# Patient Record
Sex: MALE | Race: WHITE | Employment: OTHER | URBAN - METROPOLITAN AREA
[De-identification: names, ages, dates, MRNs, and addresses within clinical notes are randomized per-mention and may not be internally consistent; named-entity substitution may affect disease eponyms.]

---

## 2017-10-03 ENCOUNTER — APPOINTMENT (OUTPATIENT)
Dept: GENERAL RADIOLOGY | Age: 52
End: 2017-10-03
Attending: EMERGENCY MEDICINE
Payer: COMMERCIAL

## 2017-10-03 ENCOUNTER — HOSPITAL ENCOUNTER (OUTPATIENT)
Age: 52
Setting detail: OBSERVATION
Discharge: HOME | End: 2017-10-03
Attending: EMERGENCY MEDICINE | Admitting: INTERNAL MEDICINE
Payer: COMMERCIAL

## 2017-10-03 ENCOUNTER — APPOINTMENT (OUTPATIENT)
Dept: CT IMAGING | Age: 52
End: 2017-10-03
Attending: SPECIALIST
Payer: COMMERCIAL

## 2017-10-03 VITALS
RESPIRATION RATE: 13 BRPM | OXYGEN SATURATION: 100 % | SYSTOLIC BLOOD PRESSURE: 149 MMHG | HEIGHT: 75 IN | BODY MASS INDEX: 23.25 KG/M2 | WEIGHT: 187 LBS | TEMPERATURE: 98.3 F | HEART RATE: 57 BPM | DIASTOLIC BLOOD PRESSURE: 94 MMHG

## 2017-10-03 DIAGNOSIS — R07.9 ACUTE CHEST PAIN: Primary | ICD-10-CM

## 2017-10-03 LAB
ALBUMIN SERPL-MCNC: 3.7 G/DL (ref 3.5–5)
ALBUMIN/GLOB SERPL: 1.1 {RATIO} (ref 1.1–2.2)
ALP SERPL-CCNC: 65 U/L (ref 45–117)
ALT SERPL-CCNC: 31 U/L (ref 12–78)
ANION GAP SERPL CALC-SCNC: 15 MMOL/L (ref 5–15)
APTT PPP: 23.7 SEC (ref 22.1–32.5)
AST SERPL-CCNC: 36 U/L (ref 15–37)
ATRIAL RATE: 55 BPM
BASOPHILS # BLD: 0 K/UL (ref 0–0.1)
BASOPHILS NFR BLD: 0 % (ref 0–1)
BILIRUB SERPL-MCNC: 0.3 MG/DL (ref 0.2–1)
BNP SERPL-MCNC: 46 PG/ML (ref 0–125)
BUN SERPL-MCNC: 17 MG/DL (ref 6–20)
BUN/CREAT SERPL: 15 (ref 12–20)
CALCIUM SERPL-MCNC: 8.9 MG/DL (ref 8.5–10.1)
CALCULATED P AXIS, ECG09: 33 DEGREES
CALCULATED R AXIS, ECG10: 41 DEGREES
CALCULATED T AXIS, ECG11: 41 DEGREES
CHLORIDE SERPL-SCNC: 106 MMOL/L (ref 97–108)
CK MB CFR SERPL CALC: NORMAL % (ref 0–2.5)
CK MB SERPL-MCNC: <1 NG/ML (ref 5–25)
CK SERPL-CCNC: 116 U/L (ref 39–308)
CO2 SERPL-SCNC: 23 MMOL/L (ref 21–32)
CREAT SERPL-MCNC: 1.14 MG/DL (ref 0.7–1.3)
D DIMER PPP FEU-MCNC: 0.35 MG/L FEU (ref 0–0.65)
DIAGNOSIS, 93000: NORMAL
EOSINOPHIL # BLD: 0.2 K/UL (ref 0–0.4)
EOSINOPHIL NFR BLD: 2 % (ref 0–7)
ERYTHROCYTE [DISTWIDTH] IN BLOOD BY AUTOMATED COUNT: 14.9 % (ref 11.5–14.5)
GLOBULIN SER CALC-MCNC: 3.4 G/DL (ref 2–4)
GLUCOSE SERPL-MCNC: 145 MG/DL (ref 65–100)
HCT VFR BLD AUTO: 38.7 % (ref 36.6–50.3)
HGB BLD-MCNC: 12.7 G/DL (ref 12.1–17)
INR PPP: 1 (ref 0.9–1.1)
LYMPHOCYTES # BLD: 3.2 K/UL (ref 0.8–3.5)
LYMPHOCYTES NFR BLD: 47 % (ref 12–49)
MCH RBC QN AUTO: 29.1 PG (ref 26–34)
MCHC RBC AUTO-ENTMCNC: 32.8 G/DL (ref 30–36.5)
MCV RBC AUTO: 88.8 FL (ref 80–99)
MONOCYTES # BLD: 0.6 K/UL (ref 0–1)
MONOCYTES NFR BLD: 9 % (ref 5–13)
NEUTS SEG # BLD: 2.9 K/UL (ref 1.8–8)
NEUTS SEG NFR BLD: 42 % (ref 32–75)
P-R INTERVAL, ECG05: 156 MS
PLATELET # BLD AUTO: 197 K/UL (ref 150–400)
POTASSIUM SERPL-SCNC: 3.2 MMOL/L (ref 3.5–5.1)
PROT SERPL-MCNC: 7.1 G/DL (ref 6.4–8.2)
PROTHROMBIN TIME: 10 SEC (ref 9–11.1)
Q-T INTERVAL, ECG07: 456 MS
QRS DURATION, ECG06: 98 MS
QTC CALCULATION (BEZET), ECG08: 436 MS
RBC # BLD AUTO: 4.36 M/UL (ref 4.1–5.7)
SODIUM SERPL-SCNC: 144 MMOL/L (ref 136–145)
THERAPEUTIC RANGE,PTTT: NORMAL SECS (ref 58–77)
TROPONIN I SERPL-MCNC: <0.04 NG/ML
TROPONIN I SERPL-MCNC: <0.04 NG/ML
VENTRICULAR RATE, ECG03: 55 BPM
WBC # BLD AUTO: 6.8 K/UL (ref 4.1–11.1)

## 2017-10-03 PROCEDURE — 82550 ASSAY OF CK (CPK): CPT | Performed by: EMERGENCY MEDICINE

## 2017-10-03 PROCEDURE — 71010 XR CHEST PORT: CPT

## 2017-10-03 PROCEDURE — 93005 ELECTROCARDIOGRAM TRACING: CPT

## 2017-10-03 PROCEDURE — 74011250637 HC RX REV CODE- 250/637: Performed by: EMERGENCY MEDICINE

## 2017-10-03 PROCEDURE — 74011250637 HC RX REV CODE- 250/637: Performed by: INTERNAL MEDICINE

## 2017-10-03 PROCEDURE — 85730 THROMBOPLASTIN TIME PARTIAL: CPT | Performed by: EMERGENCY MEDICINE

## 2017-10-03 PROCEDURE — 74011250636 HC RX REV CODE- 250/636: Performed by: INTERNAL MEDICINE

## 2017-10-03 PROCEDURE — 84484 ASSAY OF TROPONIN QUANT: CPT | Performed by: EMERGENCY MEDICINE

## 2017-10-03 PROCEDURE — 85379 FIBRIN DEGRADATION QUANT: CPT | Performed by: EMERGENCY MEDICINE

## 2017-10-03 PROCEDURE — 85610 PROTHROMBIN TIME: CPT | Performed by: EMERGENCY MEDICINE

## 2017-10-03 PROCEDURE — 96372 THER/PROPH/DIAG INJ SC/IM: CPT

## 2017-10-03 PROCEDURE — 94762 N-INVAS EAR/PLS OXIMTRY CONT: CPT

## 2017-10-03 PROCEDURE — 99285 EMERGENCY DEPT VISIT HI MDM: CPT

## 2017-10-03 PROCEDURE — 80053 COMPREHEN METABOLIC PANEL: CPT | Performed by: EMERGENCY MEDICINE

## 2017-10-03 PROCEDURE — 85025 COMPLETE CBC W/AUTO DIFF WBC: CPT | Performed by: EMERGENCY MEDICINE

## 2017-10-03 PROCEDURE — 99218 HC RM OBSERVATION: CPT

## 2017-10-03 PROCEDURE — 71275 CT ANGIOGRAPHY CHEST: CPT

## 2017-10-03 PROCEDURE — 36415 COLL VENOUS BLD VENIPUNCTURE: CPT | Performed by: INTERNAL MEDICINE

## 2017-10-03 PROCEDURE — 96360 HYDRATION IV INFUSION INIT: CPT

## 2017-10-03 PROCEDURE — 74011636320 HC RX REV CODE- 636/320: Performed by: RADIOLOGY

## 2017-10-03 PROCEDURE — 83880 ASSAY OF NATRIURETIC PEPTIDE: CPT | Performed by: EMERGENCY MEDICINE

## 2017-10-03 RX ORDER — SILDENAFIL CITRATE 20 MG/1
20 TABLET ORAL AS NEEDED
COMMUNITY

## 2017-10-03 RX ORDER — ASPIRIN 325 MG
325 TABLET ORAL ONCE
Status: COMPLETED | OUTPATIENT
Start: 2017-10-03 | End: 2017-10-03

## 2017-10-03 RX ORDER — ATENOLOL 50 MG/1
25 TABLET ORAL DAILY
COMMUNITY

## 2017-10-03 RX ORDER — GUAIFENESIN 100 MG/5ML
81 LIQUID (ML) ORAL DAILY
Qty: 30 TAB | Refills: 0 | Status: SHIPPED
Start: 2017-10-03

## 2017-10-03 RX ORDER — ONDANSETRON 2 MG/ML
4 INJECTION INTRAMUSCULAR; INTRAVENOUS
Status: DISCONTINUED | OUTPATIENT
Start: 2017-10-03 | End: 2017-10-03 | Stop reason: HOSPADM

## 2017-10-03 RX ORDER — GUAIFENESIN 100 MG/5ML
81 LIQUID (ML) ORAL DAILY
Status: DISCONTINUED | OUTPATIENT
Start: 2017-10-03 | End: 2017-10-03 | Stop reason: HOSPADM

## 2017-10-03 RX ORDER — AMLODIPINE BESYLATE 5 MG/1
5 TABLET ORAL DAILY
COMMUNITY

## 2017-10-03 RX ORDER — NITROGLYCERIN 0.4 MG/1
0.4 TABLET SUBLINGUAL
COMMUNITY

## 2017-10-03 RX ORDER — AMLODIPINE BESYLATE 5 MG/1
5 TABLET ORAL
Status: COMPLETED | OUTPATIENT
Start: 2017-10-03 | End: 2017-10-03

## 2017-10-03 RX ORDER — ENOXAPARIN SODIUM 100 MG/ML
40 INJECTION SUBCUTANEOUS EVERY 24 HOURS
Status: DISCONTINUED | OUTPATIENT
Start: 2017-10-03 | End: 2017-10-03 | Stop reason: HOSPADM

## 2017-10-03 RX ORDER — LISINOPRIL 20 MG/1
40 TABLET ORAL DAILY
Status: DISCONTINUED | OUTPATIENT
Start: 2017-10-04 | End: 2017-10-03 | Stop reason: HOSPADM

## 2017-10-03 RX ORDER — LISINOPRIL 20 MG/1
40 TABLET ORAL
Status: COMPLETED | OUTPATIENT
Start: 2017-10-03 | End: 2017-10-03

## 2017-10-03 RX ORDER — POTASSIUM CHLORIDE 750 MG/1
40 TABLET, FILM COATED, EXTENDED RELEASE ORAL
Status: COMPLETED | OUTPATIENT
Start: 2017-10-03 | End: 2017-10-03

## 2017-10-03 RX ORDER — AMLODIPINE BESYLATE 5 MG/1
5 TABLET ORAL DAILY
Status: DISCONTINUED | OUTPATIENT
Start: 2017-10-04 | End: 2017-10-03 | Stop reason: HOSPADM

## 2017-10-03 RX ORDER — ROSUVASTATIN CALCIUM 40 MG/1
40 TABLET, COATED ORAL
COMMUNITY

## 2017-10-03 RX ORDER — LISINOPRIL 40 MG/1
40 TABLET ORAL DAILY
COMMUNITY

## 2017-10-03 RX ORDER — SODIUM CHLORIDE 0.9 % (FLUSH) 0.9 %
5-10 SYRINGE (ML) INJECTION EVERY 8 HOURS
Status: DISCONTINUED | OUTPATIENT
Start: 2017-10-03 | End: 2017-10-03 | Stop reason: HOSPADM

## 2017-10-03 RX ORDER — ATENOLOL 25 MG/1
25 TABLET ORAL DAILY
COMMUNITY
End: 2017-10-03

## 2017-10-03 RX ORDER — SODIUM CHLORIDE 0.9 % (FLUSH) 0.9 %
5-10 SYRINGE (ML) INJECTION AS NEEDED
Status: DISCONTINUED | OUTPATIENT
Start: 2017-10-03 | End: 2017-10-03 | Stop reason: HOSPADM

## 2017-10-03 RX ORDER — ROSUVASTATIN CALCIUM 10 MG/1
40 TABLET, COATED ORAL
Status: DISCONTINUED | OUTPATIENT
Start: 2017-10-03 | End: 2017-10-03 | Stop reason: HOSPADM

## 2017-10-03 RX ORDER — NAPROXEN SODIUM 220 MG
220 TABLET ORAL
COMMUNITY
End: 2022-03-18

## 2017-10-03 RX ORDER — THERA TABS 400 MCG
1 TAB ORAL DAILY
COMMUNITY

## 2017-10-03 RX ORDER — ACETAMINOPHEN 325 MG/1
650 TABLET ORAL
Status: DISCONTINUED | OUTPATIENT
Start: 2017-10-03 | End: 2017-10-03 | Stop reason: HOSPADM

## 2017-10-03 RX ADMIN — IOPAMIDOL 100 ML: 755 INJECTION, SOLUTION INTRAVENOUS at 15:39

## 2017-10-03 RX ADMIN — ASPIRIN 325 MG: 325 TABLET, COATED ORAL at 06:23

## 2017-10-03 RX ADMIN — SODIUM CHLORIDE 1000 ML: 900 INJECTION, SOLUTION INTRAVENOUS at 15:46

## 2017-10-03 RX ADMIN — IOPAMIDOL 98 ML: 755 INJECTION, SOLUTION INTRAVENOUS at 14:41

## 2017-10-03 RX ADMIN — AMLODIPINE BESYLATE 5 MG: 5 TABLET ORAL at 17:15

## 2017-10-03 RX ADMIN — ENOXAPARIN SODIUM 40 MG: 40 INJECTION SUBCUTANEOUS at 10:01

## 2017-10-03 RX ADMIN — NITROGLYCERIN 1 INCH: 20 OINTMENT TOPICAL at 06:23

## 2017-10-03 RX ADMIN — LISINOPRIL 40 MG: 20 TABLET ORAL at 17:15

## 2017-10-03 RX ADMIN — POTASSIUM CHLORIDE 40 MEQ: 750 TABLET, FILM COATED, EXTENDED RELEASE ORAL at 07:42

## 2017-10-03 NOTE — DISCHARGE SUMMARY
Physician Discharge Summary     Patient ID:  Blade Kahn  [id number]  49 y.o.  1965    Admit date: 10/3/2017    Discharge date and time: 10/3/2017    Admission Diagnoses: Chest pain at rest  Chest pain    Discharge Diagnoses: Active Problems:    Chest pain at rest (10/3/2017)      Chest pain (10/3/2017)           Hospital Course:   Chest pain / CAD: EKG non-ischemic, first troponin negative. Trend CE and order echo Continue asa, hold BB for now. CTA chest negative. Reviewed by cardiology, stable for discharge per their recs     HTN: resume lisinopril, norvasc. Hold atenolol     XOL: resume crestor       PCP: Sd Melendez MD     Consults: Cardiology    Condition of patient at discharge: improved    Discharge Exam:  Please refer to progress note from today. Disposition: home    Patient Instructions:   Current Discharge Medication List      START taking these medications    Details   aspirin 81 mg chewable tablet Take 1 Tab by mouth daily. Qty: 30 Tab, Refills: 0         CONTINUE these medications which have NOT CHANGED    Details   lisinopril (PRINIVIL, ZESTRIL) 40 mg tablet Take 40 mg by mouth daily. rosuvastatin (CRESTOR) 40 mg tablet Take 40 mg by mouth nightly. nitroglycerin (NITROSTAT) 0.4 mg SL tablet 0.4 mg by SubLINGual route every five (5) minutes as needed for Chest Pain. amLODIPine (NORVASC) 5 mg tablet Take 5 mg by mouth daily. sildenafil (REVATIO) 20 mg tablet Take 20 mg by mouth as needed (take 1-3 tablets as needed). KRILL OIL PO Take 1 Cap by mouth daily. therapeutic multivitamin (THERAGRAN) tablet Take 1 Tab by mouth daily. atenolol (TENORMIN) 50 mg tablet Take 25 mg by mouth daily.          STOP taking these medications       ASPIRIN/SALICYLAMIDE/CAFFEINE (BC HEADACHE POWDER PO) Comments:   Reason for Stopping:         naproxen sodium (ALEVE) 220 mg tablet Comments:   Reason for Stopping:             Activity: Activity as tolerated  Diet: Regular Diet  Wound Care: None needed    Follow-up tests/labs stress test with Dr. Bladimir Puri    Approximate time spent in patient care on day of discharge: 33 minutes    Signed:  Odalys Shaw MD  10/3/2017  5:17 PM

## 2017-10-03 NOTE — PROGRESS NOTES
10/3/2017   CARE MANAGEMENT NOTE:  CM is following pt in the ER for initial discharge planning. EMR reviewed. CM met with pt and family at bedside to obtain hx for this needs assessment. Reportedly, pt resides with his wife, Kervin Ramos ([phone]) and step-dtr in a second floor apt with about 27 steps to the entrance. PTA, pt was ambulatory, indepn with ADLs, and drives. He is self-employed and has Tin. Pt was instructed to have family bring in insurance card to Pt. Access for billing. He uses [address] [place] [address]. Pt does not have home healthcare nor DME for home use. PCP is Dr. Mary Maria. Plan is to return home when medically stable for discharge. Pt is currently admitted under OBS status. CM provided pt with a written and oral explanation of State OBS status. A signed copy will be scanned into pt's chart.   Philip

## 2017-10-03 NOTE — DISCHARGE INSTRUCTIONS
HOSPITALIST DISCHARGE INSTRUCTIONS  NAME: Laura Mcnamara   :  1965   MRN:  [id number]     Date/Time:  10/3/2017 5:16 PM    ADMIT DATE: 10/3/2017     DISCHARGE DATE: 10/3/2017     ADMITTING DIAGNOSIS:  Chest pain  CAD    DISCHARGE DIAGNOSIS:  As above    MEDICATIONS:    · It is important that you take the medication exactly as they are prescribed. · Keep your medication in the bottles provided by the pharmacist and keep a list of the medication names, dosages, and times to be taken in your wallet. · Do not take other medications without consulting your doctor. Pain Management: per above medications    What to do at Home:  1. Follow up with your cardiologist to obtain a stress test    Recommended diet:  Cardiac Diet    Recommended activity: Activity as tolerated    If you experience any of the following symptoms then please call your primary care physician or return to the emergency room if you cannot get hold of your doctor:  Fever, chills, nausea, vomiting, diarrhea, change in mentation, falling, bleeding, shortness of breath        Information obtained by :  I understand that if any problems occur once I am at home I am to contact my physician. I understand and acknowledge receipt of the instructions indicated above.                                                                                                                                            Physician's or R.N.'s Signature                                                                  Date/Time                                                                                                                                              Patient or Representative Signature                                                          Date/Time

## 2017-10-03 NOTE — PROGRESS NOTES
CTA report reviewed. No aneurysm noted on CTA. Given this and aforementioned reasons, I feel he is stable for discharge from a cardiac standpoint. He should f/u with Dr. Stewart Cushing.

## 2017-10-03 NOTE — Clinical Note
Status[de-identified] Inpatient [101] Type of Bed: Telemetry [19] Inpatient Hospitalization Certified Necessary for the Following Reasons: 3. Patient receiving treatment that can only be provided in an inpatient setting (further clarification in H&P documentation) Admitting Diagnosis: Chest pain at rest [[id number]] Admitting Physician: Surya Gómez Attending Physician: Surya Gómez Estimated Length of Stay: 3-4 Midnights Discharge Plan[de-identified] Home with Office Follow-up

## 2017-10-03 NOTE — ED NOTES
Dr. Angelo Pinzon made aware that patient has been cleared by cardiology. Pt is pain free and denies chest pain. BP elevated and patient is asymptomatic. BP medications released for patient to be given today's dose. Discharge orders and instructions to follow per Dr. Angelo Pinzon. Primary RN made aware.

## 2017-10-03 NOTE — H&P
[address]  [phone]    Admission History and Physical      NAME:  Chanda Conde   :   1965   MRN:  [id number]     PCP:  Pili Mendoza MD     Date/Time:  10/3/2017         Subjective:     CHIEF COMPLAINT: chest pain     HISTORY OF PRESENT ILLNESS:     Mr. Dominik Bob is a 49 y.o. with h/o CAD, htn, xol who presents with chest pain. Pt notes the pain woke him from sleep early this morning. He notes substernal chest pain with no radiation. Had associated nausea and sweating. He feels this was different from previous angina episodes as it was worse. He is currently cp free. He had a stent in 2016 and was on effient for one year. Past Medical History:   Diagnosis Date    CAD (coronary artery disease)     Hernia, inguinal         Past Surgical History:   Procedure Laterality Date    CARDIAC SURG PROCEDURE UNLIST   &2016    stents       Social History   Substance Use Topics    Smoking status: Never Smoker    Smokeless tobacco: Never Used    Alcohol use No        Family history  HTN     No Known Allergies     Prior to Admission medications    Medication Sig Start Date End Date Taking? Authorizing Provider   lisinopril (PRINIVIL, ZESTRIL) 40 mg tablet Take 40 mg by mouth daily. Yes Phys Other, MD   ASPIRIN/SALICYLAMIDE/CAFFEINE (BC HEADACHE POWDER PO) Take 1 Dose by mouth three (3) times daily as needed for Other (patient takes 2-3 times daily as needed for headache). Yes Phys Other, MD   rosuvastatin (CRESTOR) 40 mg tablet Take 40 mg by mouth nightly. Yes Phys Other, MD   nitroglycerin (NITROSTAT) 0.4 mg SL tablet 0.4 mg by SubLINGual route every five (5) minutes as needed for Chest Pain. Yes Phys Other, MD   amLODIPine (NORVASC) 5 mg tablet Take 5 mg by mouth daily. Yes Historical Provider   sildenafil (REVATIO) 20 mg tablet Take 20 mg by mouth as needed (take 1-3 tablets as needed).    Yes Historical Provider naproxen sodium (ALEVE) 220 mg tablet Take 220 mg by mouth two (2) times daily as needed. Yes Historical Provider   KRILL OIL PO Take 1 Cap by mouth daily. Yes Historical Provider   therapeutic multivitamin (THERAGRAN) tablet Take 1 Tab by mouth daily. Yes Historical Provider   atenolol (TENORMIN) 50 mg tablet Take 25 mg by mouth daily. Yes Historical Provider         Review of Systems:      Gen:  Eyes:  ENT:  CVS:  chest painPulm:  GI:    :    MS:  Skin:  Psych:  Endo:    Hem:  Renal:    Neuro:            Objective:      VITALS:    Vital signs reviewed; most recent are:    Visit Vitals    BP (!) 162/104    Pulse 60    Temp 98.3 °F (36.8 °C)    Resp 14    Ht 6' 3\" (1.905 m)    Wt 84.8 kg (187 lb)    SpO2 93%    BMI 23.37 kg/m2     SpO2 Readings from Last 6 Encounters:   10/03/17 93%        No intake or output data in the 24 hours ending 10/03/17 1710         Exam:     Physical Exam:    Gen:  Well-developed, well-nourished, in no acute distress  HEENT:  Pink conjunctivae, PERRL, hearing intact to voice, moist mucous membranes  Neck:  Supple, without masses, thyroid non-tender  Resp:  No accessory muscle use, clear breath sounds without wheezes rales or rhonchi  Card:  No murmurs, normal S1, S2 without thrills, bruits or peripheral edema  Abd:  Soft, non-tender, non-distended, normoactive bowel sounds are present, no palpable organomegaly  Lymph:  No cervical adenopathy  Musc:  No cyanosis or clubbing  Skin:  No rashes or ulcers, skin turgor is good  Neuro:  Cranial nerves 3-12 are grossly intact,  strength is 5/5 bilaterally, dorsi / plantarflexion strength is 5/5 bilaterally, follows commands appropriately  Psych:  Alert with good insight.   Oriented to person, place, and time       Labs:    Recent Labs      10/03/17   0607   WBC  6.8   HGB  12.7   HCT  38.7   PLT  197     Recent Labs      10/03/17   0607   NA  144   K  3.2*   CL  106   CO2  23   GLU  145*   BUN  17   CREA  1.14   CA  8.9 ALB  3.7   SGOT  36   ALT  31     No components found for: GLPOC  No results for input(s): PH, PCO2, PO2, HCO3, FIO2 in the last 72 hours. Recent Labs      10/03/17   0607   INR  1.0       Chest Xray:  No acute process  EKG reviewed:   NSR no ST [place] changes       Assessment/Plan:        Chest pain / CAD: EKG non-ischemic, first troponin negative. Trend CE and order echo Continue asa, hold BB for now. Consult cardiology for consideration of stress test.     HTN: resume lisinopril, norvasc.  Hold atenolol    XOL: resume crestor        Surrogate decision maker: wife    Total time spent with patient: 61 [address] discussed with: Patient and Family    Discussed:  Care Plan    Prophylaxis:  Lovenox    Probable Disposition:  Home w/Family           ___________________________________________________    Attending Physician: Tripp Tineo MD

## 2017-10-03 NOTE — PROGRESS NOTES
Initial echo images for stress echo revealed aortic root measuring 4.7cm and asc Ao of 4.0cm. Will cancel stress echo which, given description of chest discomfort, negative troponins, and unremarkable EKG, can be done as outpt by primary cardiologist, Dr. Gail Chu. Will request CTA to further evaluate aortic root. Discussed with Dr. Ranjana Coleman.

## 2017-10-03 NOTE — ED PROVIDER NOTES
HPI Comments: The patient is a 26-year-old male with a past medical history significant for MI with stents x2 and hypertension who presents to the ED with a complaint of sudden onset of midsternal chest pain described as pressure and tightness, severity 8/10, constant, accompanied by nausea, diaphoresis, dizziness, and lightheadedness. The patient took one sublingual nitroglycerin without any relief of symptoms. The patient also admits to intermittent episodes of cough and congestion times one week. Patient denies fever, sore throat, headache, blurred vision, neck or back pain, shortness of breath, abdominal pain, diarrhea, constipation, dysuria, hematuria, dizziness, weakness, numbness, sick contact. No skin rash, prior history of same. Patient is a 49 y.o. male presenting with chest pain. Chest Pain (Angina)           No past medical history on file. No past surgical history on file. No family history on file. Social History     Social History    Marital status: N/A     Spouse name: N/A    Number of children: N/A    Years of education: N/A     Occupational History    Not on file. Social History Main Topics    Smoking status: Not on file    Smokeless tobacco: Not on file    Alcohol use Not on file    Drug use: Not on file    Sexual activity: Not on file     Other Topics Concern    Not on file     Social History Narrative         ALLERGIES: Review of patient's allergies indicates no known allergies. Review of Systems   Cardiovascular: Positive for chest pain. All other systems reviewed and are negative. There were no vitals filed for this visit. Physical Exam   Nursing note and vitals reviewed. CONSTITUTIONAL: Well-appearing; well-nourished; in mild distress  HEAD: Normocephalic; atraumatic  EYES: PERRL; EOM intact; conjunctiva and sclera are clear bilaterally.   ENT: No rhinorrhea; normal pharynx with no tonsillar hypertrophy; mucous membranes pink/moist, no erythema, no exudate. NECK: Supple; non-tender; no cervical lymphadenopathy  CARD: Normal S1, S2; no murmurs, rubs, or gallops. Regular rate and rhythm. RESP: Normal respiratory effort; breath sounds clear and equal bilaterally; no wheezes, rhonchi, or rales. ABD: Normal bowel sounds; non-distended; non-tender; no palpable organomegaly, no masses, no bruits. Back Exam: Normal inspection; no vertebral point tenderness, no CVA tenderness. Normal range of motion. EXT: Normal ROM in all four extremities; non-tender to palpation; no swelling or deformity; distal pulses are normal, no edema. SKIN: Warm; dry; no rash. NEURO:Alert and oriented x 3, coherent, OLGA-XII grossly intact, sensory and motor are non-focal.        MDM  Number of Diagnoses or Management Options  Diagnosis management comments: Assessment: 27-year-old man with a moderate risk factors for ACS, who presents with midsternal chest pain. Symptoms appear consistent with stable angina, rule out ACS, electrolyte abnormality, venous thromboembolism, dissection, pleurisy, pneumonia.     Plan: EKG/chest x-ray/lab/IV fluid/aspirin/Nitropaste 1 inch/analgesia and antiemetic/ serial exam/ monitor and reevaluate         Amount and/or Complexity of Data Reviewed  Clinical lab tests: ordered and reviewed  Tests in the radiology section of CPT®: ordered and reviewed  Tests in the medicine section of CPT®: reviewed and ordered  Discussion of test results with the performing providers: yes  Decide to obtain previous medical records or to obtain history from someone other than the patient: yes  Obtain history from someone other than the patient: yes  Review and summarize past medical records: yes  Discuss the patient with other providers: yes  Independent visualization of images, tracings, or specimens: yes    Risk of Complications, Morbidity, and/or Mortality  Presenting problems: moderate  Diagnostic procedures: moderate  Management options: moderate      ED Course Procedures   ED EKG interpretation:  Rhythm: sinus bradycardia; and regular . Rate (approx.): 55; Axis: normal; P wave: normal; QRS interval: normal ; ST/T wave: non-specific changes; in  Lead: Diffusely; Other findings: borderline ekg. This EKG was interpreted by Pam Marcus MD,ED Provider. XRAY INTERPRETATION (ED MD)  Chest Xray  No acute process seen. Normal heart size. No bony abnormalities. No infiltrate. Pam Marcus MD 6:07 AM    PROGRESS NOTE:  Pt has been reexamined by Pam Marcus MD all available results have been reviewed with pt and any available family. Pt understands sx, dx, and tx in ED. Care plan has been outlined and questions have been answered. Pt and any available family understands and agrees to need for admission to hospital for further tx not available in ED. Pt is ready for admission. Written by Pam Marcus MD,  7:20 AM    .   CONSULT NOTE:  Pam Marcus MD spoke with Dr. Alison Coy of the adult hospitalist team. Discussed patient's presentation, history, physical assessment, and available diagnostic results. He will evaluate, write orders and admit the patient to the hospital. 7:20 AM    .   .

## 2017-10-03 NOTE — ED TRIAGE NOTES
Woke up with chesty pain 0530, had some last night about 0200 too, nausea and dizzy and sweating with it.  Took a NTG before coming but it has not helped much

## 2017-10-03 NOTE — ED NOTES
The patient denies any chest pain at this time and is resting comfortably with family at the bedside.

## 2017-10-03 NOTE — PROGRESS NOTES
[facility]: MED RECONCILIATION    Comments/Recommendations:   · Pt was alert and oriented and able to answer all questions himself. · Pt stated he does not take Aleve often per his cardiologists request, but does on occasion. · Pt stated his cardiologist prescribed 81mg aspirin, but since he was taking BC powder pt decided he didn't have a need for the baby ASA  · Patient is aware to not take naproxen and BC powder at the same time. Adverse effects of NSAIDs reviewed and acetaminophen recomeneded  · Preferred pharmacy entered into the EMR and phoned to verify prescription medications listed below  · Pt last documented fill of nitroglycerin was in 2015. Returned to room and counseled patient that he should get this medication refilled yearly and the importance of keeping track of expiration dates. · Patient cannot report the last dose of sildenafil but cannot report he did not take a dose last evening. Medications added:     · Amlodipine  · Sildenafil  · One A Day multivitamin  · Aleve   · Omega 3 Krill Oil     Medications removed:    · N/A    Medications adjusted:    · BC Headache Power       Allergies: Review of patient's allergies indicates no known allergies. Prior to Admission Medications:     Medication Documentation Review Audit      Prior to Admission Medications   Prescriptions Last Dose Informant Patient Reported? Taking? ASPIRIN/SALICYLAMIDE/CAFFEINE (BC HEADACHE POWDER PO) 10/3/2017 at 0500  Yes Yes   Sig: Take  by mouth as needed for Other (patient takes 2-3 times daily as needed for headache). KRILL OIL PO   Yes Yes   Sig: Take 1 Cap by mouth daily. amLODIPine (NORVASC) 5 mg tablet   Yes Yes   Sig: Take 5 mg by mouth daily. atenolol (TENORMIN) 25 mg tablet 10/2/2017 at 2100  Yes Yes   Sig: Take 25 mg by mouth daily. lisinopril (PRINIVIL, ZESTRIL) 40 mg tablet 10/2/2017 at 0630  Yes Yes   Sig: Take 40 mg by mouth daily.    naproxen sodium (ALEVE) 220 mg tablet   Yes Yes   Sig: Take 220 mg by mouth as needed. nitroglycerin (NITROSTAT) 0.4 mg SL tablet 10/3/2017 at 0530  Yes Yes   Si.4 mg by SubLINGual route every five (5) minutes as needed for Chest Pain. rosuvastatin (CRESTOR) 40 mg tablet 10/2/2017 at 2100  Yes Yes   Sig: Take 40 mg by mouth nightly. sildenafil (REVATIO) 20 mg tablet   Yes Yes   Sig: Take 20 mg by mouth as needed (take 1-3 tablets as needed). therapeutic multivitamin [facility]) tablet   Yes Yes   Sig: Take 1 Tab by mouth daily. Facility-Administered Medications: None               Thank you.  Karen Waggoner, Pharmacy Candidate [employer] Class of 2018   Reviewed and approved  Favian Barnes, Teri, BCPS

## 2017-10-03 NOTE — CONSULTS
Jaxon Gamez MD, [address] [facility]  Office [phone]    Date of  Admission: 10/3/2017  6:01 AM         IMPRESSION and RECOMMENDATIONS     1.  CP/CAD:  CP somewhat atypical for angina and his angina. Initial enzymes normal.  EKG benign. If second set of enzymes are unremarkable, then will pursue exercise echo. If this is normal, then stable for discharge from a cardiac standpoint. If troponin is remarkable, then would pursue cath. Continue creator, ASA. Hold atenolol for stress test.  I have discussed this plan with the patient. He appears to understand this plan and wishes to proceed ahead. Problem List  Date Reviewed: 10/3/2017          Codes Class Noted    Chest pain at rest ICD-10-CM: R07.9  ICD-9-CM: 786.50  10/3/2017        Chest pain ICD-10-CM: R07.9  ICD-9-CM: 786.50  10/3/2017              History of Present Illness:     Awa Silverio is a 49 y.o. male with the above problem list who was admitted for Chest pain at rest  Chest pain. Pt presents with CP starting at 2am.  Slowly resolved, then recurred. Described as burning in center of chest radiating to back, associated with diaphoresis and nausea. No relief with SL NTG. Lasted ~2 hrs. Now pain free. No other recent CP including with activity. Prior angina was different and with activity. He denies other chest pain/discomfort, shortness of breath, dyspnea on exertion, orthopnea, paroxysmal noctural dyspnea, lower extremity edema, palpitations, syncope, or near-syncope.     Current Facility-Administered Medications   Medication Dose Route Frequency    sodium chloride (NS) flush 5-10 mL  5-10 mL IntraVENous Q8H    sodium chloride (NS) flush 5-10 mL  5-10 mL IntraVENous PRN    acetaminophen (TYLENOL) tablet 650 mg  650 mg Oral Q4H PRN    ondansetron (ZOFRAN) injection 4 mg  4 mg IntraVENous Q4H PRN    enoxaparin (LOVENOX) injection 40 mg  40 mg SubCUTAneous Q24H    sodium chloride (NS) flush 5-10 mL  5-10 mL IntraVENous Q8H    sodium chloride (NS) flush 5-10 mL  5-10 mL IntraVENous PRN    aspirin chewable tablet 81 mg  81 mg Oral DAILY     Current Outpatient Prescriptions   Medication Sig    atenolol (TENORMIN) 25 mg tablet Take 25 mg by mouth daily.  lisinopril (PRINIVIL, ZESTRIL) 40 mg tablet Take 40 mg by mouth daily.  ASPIRIN/SALICYLAMIDE/CAFFEINE (BC HEADACHE POWDER PO) Take  by mouth as needed.  rosuvastatin (CRESTOR) 40 mg tablet Take 40 mg by mouth nightly.  nitroglycerin (NITROSTAT) 0.4 mg SL tablet 0.4 mg by SubLINGual route every five (5) minutes as needed for Chest Pain. No Known Allergies   No family history on file. Social History     Social History    Marital status:      Spouse name: N/A    Number of children: N/A    Years of education: N/A     Occupational History    Not on file. Social History Main Topics    Smoking status: Never Smoker    Smokeless tobacco: Never Used    Alcohol use No    Drug use: No    Sexual activity: Not on file     Other Topics Concern    Not on file     Social History Narrative    No narrative on file       Physical Exam:     Patient Vitals for the past 16 hrs:   BP Temp Pulse Resp SpO2 Height Weight   10/03/17 0847 - 98.3 °F (36.8 °C) - - - - -   10/03/17 0830 128/81 - (!) 57 14 - - -   10/03/17 0815 125/66 - (!) 53 21 99 % - -   10/03/17 0800 125/77 - (!) 56 15 100 % - -   10/03/17 0745 129/79 - (!) 59 10 - - -   10/03/17 0730 123/76 - (!) 55 12 100 % - -   10/03/17 0715 113/67 - (!) 57 16 100 % - -   10/03/17 0700 117/73 97.8 °F (36.6 °C) (!) 56 15 100 % - -   10/03/17 0602 106/65 - (!) 53 16 100 % 6' 3\" (1.905 m) 187 lb (84.8 kg)       HEENT Exam:     Normocephalic, atraumatic. EOMI. Oropharynx negative. Neck supple. No lymphadenopathy. Lung Exam:     The patient is not dyspneic. There is no cough. Breath sounds are heard equally in all lung fields.  There are no wheezes, rales, rhonchi, or rubs heard on auscultation. Heart Exam:     The rhythm is regular. The PMI is in the 5th intercostal space of the MCL. Apical impulse is normal. S1 is regular. S2 is physiologic. There is no S3, S4 gallop, murmur, click, or rub. Abdomen Exam:     Bowel sounds are normoactive. Abdomen soft in all quadrants. No tenderness. No palpable masses. No organomegaly. No hernias noted. No bruits or pulsatile mass. Extremities Exam:     The extremities are atraumatic appearing. There is no clubbing, cyanosis, edema, ulcers, varicose veins, rash, erythemia noted in the extremities. The neurovascular status is grossly intact with normal distal sensation and pulses. Vascular Exam:     The radial, brachial, dorsalis pedis, posterior tibial, are equal and strong bilaterally The carotids are equal bilaterally without bruits.           Labs:     Lab Results   Component Value Date/Time    Glucose 145 10/03/2017 06:07 AM    Sodium 144 10/03/2017 06:07 AM    Potassium 3.2 10/03/2017 06:07 AM    Chloride 106 10/03/2017 06:07 AM    CO2 23 10/03/2017 06:07 AM    BUN 17 10/03/2017 06:07 AM    Creatinine 1.14 10/03/2017 06:07 AM    Calcium 8.9 10/03/2017 06:07 AM     Recent Labs      10/03/17   0607   WBC  6.8   HGB  12.7   HCT  38.7   PLT  197     Recent Labs      10/03/17   0607   SGOT  36   ALT  31   AP  65   TBILI  0.3   TP  7.1   ALB  3.7   GLOB  3.4     Recent Labs      10/03/17   0607   INR  1.0   PTP  10.0   APTT  23.7      Recent Labs      10/03/17   0607   CPK  116   CKMB  <1.0     Recent Labs      10/03/17   0607   TROIQ  <0.04     No results found for: CHOL, CHOLX, CHLST, CHOLV, HDL, LDL, LDLC, DLDLP, TGLX, TRIGL, TRIGP, CHHD, CHHDX    EKG:  SB @ 55, no isch

## 2018-09-14 ENCOUNTER — OP HISTORICAL/CONVERTED ENCOUNTER (OUTPATIENT)
Dept: OTHER | Age: 53
End: 2018-09-14

## 2020-05-18 ENCOUNTER — ED HISTORICAL/CONVERTED ENCOUNTER (OUTPATIENT)
Dept: OTHER | Age: 55
End: 2020-05-18

## 2021-08-03 PROBLEM — R07.9 CHEST PAIN: Status: RESOLVED | Noted: 2017-10-03 | Resolved: 2021-08-03

## 2022-03-18 ENCOUNTER — HOSPITAL ENCOUNTER (EMERGENCY)
Age: 57
Discharge: HOME | End: 2022-03-18
Attending: STUDENT IN AN ORGANIZED HEALTH CARE EDUCATION/TRAINING PROGRAM
Payer: COMMERCIAL

## 2022-03-18 ENCOUNTER — APPOINTMENT (OUTPATIENT)
Dept: CT IMAGING | Age: 57
End: 2022-03-18
Attending: STUDENT IN AN ORGANIZED HEALTH CARE EDUCATION/TRAINING PROGRAM
Payer: COMMERCIAL

## 2022-03-18 VITALS
SYSTOLIC BLOOD PRESSURE: 163 MMHG | TEMPERATURE: 97.9 F | RESPIRATION RATE: 16 BRPM | DIASTOLIC BLOOD PRESSURE: 99 MMHG | OXYGEN SATURATION: 100 % | HEART RATE: 70 BPM

## 2022-03-18 DIAGNOSIS — K52.839 MICROSCOPIC COLITIS, UNSPECIFIED MICROSCOPIC COLITIS TYPE: ICD-10-CM

## 2022-03-18 DIAGNOSIS — R10.9 ABDOMINAL PAIN, UNSPECIFIED ABDOMINAL LOCATION: Primary | ICD-10-CM

## 2022-03-18 PROBLEM — R07.9 CHEST PAIN AT REST: Status: ACTIVE | Noted: 2017-10-03

## 2022-03-18 LAB
ANION GAP SERPL CALC-SCNC: 5 MMOL/L (ref 5–15)
ATRIAL RATE: 66 BPM
BASOPHILS # BLD: 0 K/UL (ref 0–0.1)
BASOPHILS NFR BLD: 0 % (ref 0–1)
BUN SERPL-MCNC: 24 MG/DL (ref 6–20)
BUN/CREAT SERPL: 21 (ref 12–20)
CA-I BLD-MCNC: 9.5 MG/DL (ref 8.5–10.1)
CALCULATED P AXIS, ECG09: 38 DEGREES
CALCULATED R AXIS, ECG10: 54 DEGREES
CALCULATED T AXIS, ECG11: 63 DEGREES
CHLORIDE SERPL-SCNC: 105 MMOL/L (ref 97–108)
CO2 SERPL-SCNC: 25 MMOL/L (ref 21–32)
CREAT SERPL-MCNC: 1.16 MG/DL (ref 0.7–1.3)
DIAGNOSIS, 93000: NORMAL
DIFFERENTIAL METHOD BLD: ABNORMAL
EOSINOPHIL # BLD: 0 K/UL (ref 0–0.4)
EOSINOPHIL NFR BLD: 0 % (ref 0–7)
ERYTHROCYTE [DISTWIDTH] IN BLOOD BY AUTOMATED COUNT: 13.7 % (ref 11.5–14.5)
GLUCOSE SERPL-MCNC: 132 MG/DL (ref 65–100)
HCT VFR BLD AUTO: 39.1 % (ref 36.6–50.3)
HGB BLD-MCNC: 13.2 G/DL (ref 12.1–17)
IMM GRANULOCYTES # BLD AUTO: 0.1 K/UL (ref 0–0.04)
IMM GRANULOCYTES NFR BLD AUTO: 0 % (ref 0–0.5)
LYMPHOCYTES # BLD: 0.6 K/UL (ref 0.8–3.5)
LYMPHOCYTES NFR BLD: 3 % (ref 12–49)
MCH RBC QN AUTO: 29.9 PG (ref 26–34)
MCHC RBC AUTO-ENTMCNC: 33.8 G/DL (ref 30–36.5)
MCV RBC AUTO: 88.5 FL (ref 80–99)
MONOCYTES # BLD: 0.7 K/UL (ref 0–1)
MONOCYTES NFR BLD: 4 % (ref 5–13)
NEUTS SEG # BLD: 16.8 K/UL (ref 1.8–8)
NEUTS SEG NFR BLD: 93 % (ref 32–75)
NRBC # BLD: 0 K/UL (ref 0–0.01)
NRBC BLD-RTO: 0 PER 100 WBC
P-R INTERVAL, ECG05: 154 MS
PLATELET # BLD AUTO: 220 K/UL (ref 150–400)
PMV BLD AUTO: 11.8 FL (ref 8.9–12.9)
POTASSIUM SERPL-SCNC: 3.7 MMOL/L (ref 3.5–5.1)
Q-T INTERVAL, ECG07: 430 MS
QRS DURATION, ECG06: 94 MS
QTC CALCULATION (BEZET), ECG08: 450 MS
RBC # BLD AUTO: 4.42 M/UL (ref 4.1–5.7)
SODIUM SERPL-SCNC: 135 MMOL/L (ref 136–145)
TROPONIN-HIGH SENSITIVITY: 4 NG/L (ref 0–76)
VENTRICULAR RATE, ECG03: 66 BPM
WBC # BLD AUTO: 18.2 K/UL (ref 4.1–11.1)

## 2022-03-18 PROCEDURE — 74011000636 HC RX REV CODE- 636: Performed by: STUDENT IN AN ORGANIZED HEALTH CARE EDUCATION/TRAINING PROGRAM

## 2022-03-18 PROCEDURE — 99285 EMERGENCY DEPT VISIT HI MDM: CPT

## 2022-03-18 PROCEDURE — 36415 COLL VENOUS BLD VENIPUNCTURE: CPT

## 2022-03-18 PROCEDURE — 85025 COMPLETE CBC W/AUTO DIFF WBC: CPT

## 2022-03-18 PROCEDURE — 80048 BASIC METABOLIC PNL TOTAL CA: CPT

## 2022-03-18 PROCEDURE — 74177 CT ABD & PELVIS W/CONTRAST: CPT

## 2022-03-18 PROCEDURE — 74011250636 HC RX REV CODE- 250/636: Performed by: STUDENT IN AN ORGANIZED HEALTH CARE EDUCATION/TRAINING PROGRAM

## 2022-03-18 PROCEDURE — 96372 THER/PROPH/DIAG INJ SC/IM: CPT

## 2022-03-18 PROCEDURE — 93005 ELECTROCARDIOGRAM TRACING: CPT

## 2022-03-18 PROCEDURE — 84484 ASSAY OF TROPONIN QUANT: CPT

## 2022-03-18 RX ORDER — METRONIDAZOLE 500 MG/1
500 TABLET ORAL 3 TIMES DAILY
Qty: 30 TABLET | Refills: 0 | Status: SHIPPED | OUTPATIENT
Start: 2022-03-18 | End: 2022-03-28

## 2022-03-18 RX ORDER — DICYCLOMINE HYDROCHLORIDE 10 MG/ML
10 INJECTION INTRAMUSCULAR
Status: COMPLETED | OUTPATIENT
Start: 2022-03-18 | End: 2022-03-18

## 2022-03-18 RX ORDER — CIPROFLOXACIN 500 MG/1
500 TABLET ORAL 2 TIMES DAILY
Qty: 20 TABLET | Refills: 0 | Status: SHIPPED | OUTPATIENT
Start: 2022-03-18 | End: 2022-03-28

## 2022-03-18 RX ADMIN — IOPAMIDOL 100 ML: 755 INJECTION, SOLUTION INTRAVENOUS at 06:42

## 2022-03-18 RX ADMIN — DICYCLOMINE HYDROCHLORIDE 10 MG: 10 INJECTION INTRAMUSCULAR at 07:08

## 2022-03-18 NOTE — ED PROVIDER NOTES
EMERGENCY DEPARTMENT HISTORY AND PHYSICAL EXAM      Date: 3/18/2022  Patient Name: Mark Denise      History of Presenting Illness     Chief Complaint   Patient presents with    Abdominal Pain     abdominal pain and constipation x 6hrs, straining for bowel movement, diarrhea with bright red blood, PMH stents x 2, HTN, hernia, GIB ulcer    Rectal Bleeding    Constipation       History Provided By: Patient    HPI: Mark Denise, 55 y.o. male with PMH of HTN, CAD, and peptic ulcer disease who presents to the ED with abdominal pain. Patient reports that he was having some discomfort in his abdomen and lower abdomen that he was unable to characterize but nothing specific makes better worse associated with an episode of constipation. He attempted defecating and was unable to defecate. When he was straining he had an episode of near syncope without head trauma. Denying any preceding or following chest pain shortness of breath. His current complaints are abdominal pain. He had an episode of bowel movement that was with bright red blood. There are no other complaints, changes, or physical findings at this time. PCP: Other, MD Sd    Current Outpatient Medications   Medication Sig Dispense Refill    ciprofloxacin HCl (Cipro) 500 mg tablet Take 1 Tablet by mouth two (2) times a day for 10 days. 20 Tablet 0    metroNIDAZOLE (FlagyL) 500 mg tablet Take 1 Tablet by mouth three (3) times daily for 10 days. 30 Tablet 0    lisinopril (PRINIVIL, ZESTRIL) 40 mg tablet Take 40 mg by mouth daily.  rosuvastatin (CRESTOR) 40 mg tablet Take 40 mg by mouth nightly.  nitroglycerin (NITROSTAT) 0.4 mg SL tablet 0.4 mg by SubLINGual route every five (5) minutes as needed for Chest Pain.  amLODIPine (NORVASC) 5 mg tablet Take 5 mg by mouth daily.  sildenafil (REVATIO) 20 mg tablet Take 20 mg by mouth as needed (take 1-3 tablets as needed).  KRILL OIL PO Take 1 Cap by mouth daily.       therapeutic multivitamin (THERAGRAN) tablet Take 1 Tab by mouth daily.  atenolol (TENORMIN) 50 mg tablet Take 25 mg by mouth daily.  aspirin 81 mg chewable tablet Take 1 Tab by mouth daily. 30 Tab 0       Past History     Past Medical History:  Past Medical History:   Diagnosis Date    CAD (coronary artery disease)     Hernia, inguinal        Past Surgical History:  Past Surgical History:   Procedure Laterality Date    CARDIAC SURG PROCEDURE UNLIST  2015 &2016    stents       Family History:  No family history on file. Social History:  Social History     Tobacco Use    Smoking status: Never Smoker    Smokeless tobacco: Never Used   Substance Use Topics    Alcohol use: No    Drug use: No       Allergies:  No Known Allergies      Review of Systems     Review of Systems    A 10 point review of system was performed and was negative except as noted above in HPI    Physical Exam     Physical Exam  Vitals and nursing note reviewed. Exam conducted with a chaperone present. Constitutional:       General: He is not in acute distress. Appearance: He is not ill-appearing, toxic-appearing or diaphoretic. HENT:      Head: Normocephalic and atraumatic. Eyes:      Extraocular Movements: Extraocular movements intact. Pupils: Pupils are equal, round, and reactive to light. Cardiovascular:      Rate and Rhythm: Normal rate and regular rhythm. Pulmonary:      Effort: Pulmonary effort is normal.      Breath sounds: Normal breath sounds. Abdominal:      Palpations: Abdomen is soft. Tenderness: There is abdominal tenderness. Genitourinary:     Rectum: External hemorrhoid and internal hemorrhoid present. No mass, tenderness or anal fissure. Normal anal tone. Neurological:      Mental Status: He is alert and oriented to person, place, and time. Lab and Diagnostic Study Results     Labs -   No results found for this or any previous visit (from the past 12 hour(s)).     Radiologic Studies - [unfilled]  CT Results  (Last 48 hours)    None        CXR Results  (Last 48 hours)    None          Medical Decision Making and ED Course   - I am the first and primary provider for this patient AND AM THE PRIMARY PROVIDER OF RECORD. - I reviewed the vital signs, available nursing notes, past medical history, past surgical history, family history and social history. - Initial assessment performed. The patients presenting problems have been discussed, and the staff are in agreement with the care plan formulated and outlined with them. I have encouraged them to ask questions as they arise throughout their visit. Vital Signs-Reviewed the patient's vital signs. No data found. Records Reviewed: Nursing Notes and Old Medical Records    Provider Notes (Medical Decision Making):         ED Course:       ED Course as of 03/20/22 1239   Fri Mar 18, 2022   0520 Presented with abdominal pain and an episode of bloody bowel movement. He does have evidence of internal and external hemorrhoids on my evaluation. My suspicion is that the bright red blood in the patient noted is secondary to ruptured hemorrhoid. He did have some trace blood on rectal exam.  No active bleeding was noted. He does have tenderness in his abdomen. Plan is to obtain CBC, chemistry, troponin, CT abdomen pelvis with IV contrast. [AA]   0525 EKG was obtained at 5:03 AM and interpreted by me as NSR rate of 66, intervals within normal, normal axis, no ST elevation or depression, no signs of dysrhythmia or blocks. [AA]   [id number] Accepting sign out on this patient with lower abd pain. Awaiting CT scan looking for an acute source for pain and elevated wBC [CS]   0810 She has evidence of colitis without perforation. Hemodynamically stable like to go home. Will send home with appropriate antibiotics.  [CS]      ED Course User Index  [AA] Leslie Perales MD  [CS] Geno Kevin MD               Disposition     Disposition: Condition stable  DC- Adult Discharges: All of the diagnostic tests were reviewed and questions answered. Diagnosis, care plan and treatment options were discussed. The patient understands the instructions and will follow up as directed. The patients results have been reviewed with them. They have been counseled regarding their diagnosis. The patient verbally convey understanding and agreement of the signs, symptoms, diagnosis, treatment and prognosis and additionally agrees to follow up as recommended with their PCP in 24 - 48 hours. They also agree with the care-plan and convey that all of their questions have been answered. I have also put together some discharge instructions for them that include: 1) educational information regarding their diagnosis, 2) how to care for their diagnosis at home, as well a 3) list of reasons why they would want to return to the ED prior to their follow-up appointment, should their condition change. Discharged      DISCHARGE PLAN:  1. Current Discharge Medication List      CONTINUE these medications which have NOT CHANGED    Details   lisinopril (PRINIVIL, ZESTRIL) 40 mg tablet Take 40 mg by mouth daily. ASPIRIN/SALICYLAMIDE/CAFFEINE (BC HEADACHE POWDER PO) Take 1 Dose by mouth three (3) times daily as needed for Other (patient takes 2-3 times daily as needed for headache). rosuvastatin (CRESTOR) 40 mg tablet Take 40 mg by mouth nightly. nitroglycerin (NITROSTAT) 0.4 mg SL tablet 0.4 mg by SubLINGual route every five (5) minutes as needed for Chest Pain. amLODIPine (NORVASC) 5 mg tablet Take 5 mg by mouth daily. sildenafil (REVATIO) 20 mg tablet Take 20 mg by mouth as needed (take 1-3 tablets as needed). naproxen sodium (ALEVE) 220 mg tablet Take 220 mg by mouth two (2) times daily as needed. KRILL OIL PO Take 1 Cap by mouth daily. therapeutic multivitamin (THERAGRAN) tablet Take 1 Tab by mouth daily.       atenolol (TENORMIN) 50 mg tablet Take 25 mg by mouth daily. aspirin 81 mg chewable tablet Take 1 Tab by mouth daily. Qty: 30 Tab, Refills: 0           2. Follow-up Information     [facility] Details [facility]    [address] [facility] EMERGENCY DEPT Emergency Medicine Go to  As needed, If symptoms worsen [address] [facility] [address] [phone]    Primary Care Doctor  Schedule an appointment as soon as possible for a visit in 3 days For reevaluation, Discuss your visit to the ER         3. Return to ED if worse   4. Discharge Medication List as of 3/18/2022  8:12 AM      START taking these medications    Details   ciprofloxacin HCl (Cipro) 500 mg tablet Take 1 Tablet by mouth two (2) times a day for 10 days. , Normal, Disp-20 Tablet, R-0      metroNIDAZOLE (FlagyL) 500 mg tablet Take 1 Tablet by mouth three (3) times daily for 10 days. , Normal, Disp-30 Tablet, R-0         CONTINUE these medications which have NOT CHANGED    Details   lisinopril (PRINIVIL, ZESTRIL) 40 mg tablet Take 40 mg by mouth daily. , Historical Med      rosuvastatin (CRESTOR) 40 mg tablet Take 40 mg by mouth nightly., Historical Med      nitroglycerin (NITROSTAT) 0.4 mg SL tablet 0.4 mg by SubLINGual route every five (5) minutes as needed for Chest Pain., Historical Med      amLODIPine (NORVASC) 5 mg tablet Take 5 mg by mouth daily. , Historical Med      sildenafil (REVATIO) 20 mg tablet Take 20 mg by mouth as needed (take 1-3 tablets as needed). , Historical Med      KRILL OIL PO Take 1 Cap by mouth daily. , Historical Med      therapeutic multivitamin (THERAGRAN) tablet Take 1 Tab by mouth daily. , Historical Med      atenolol (TENORMIN) 50 mg tablet Take 25 mg by mouth daily. , Historical Med      aspirin 81 mg chewable tablet Take 1 Tab by mouth daily. , No Print, Disp-30 Tab, R-0      ASPIRIN/SALICYLAMIDE/CAFFEINE (BC HEADACHE POWDER PO) Take 1 Dose by mouth three (3) times daily as needed for Other (patient takes 2-3 times daily as needed for headache). , Historical Med      naproxen sodium (ALEVE) 220 mg tablet Take 220 mg by mouth two (2) times daily as needed., Historical Med             Diagnosis     Clinical Impression:   1. Abdominal pain, unspecified abdominal location    2. Microscopic colitis, unspecified microscopic colitis type        Attestations: Salena Grant MD    Please note that this dictation was completed with [employer], the computer voice recognition software. Quite often unanticipated grammatical, syntax, homophones, and other interpretive errors are inadvertently transcribed by the computer software. Please disregard these errors. Please excuse any errors that have escaped final proofreading. Thank you.

## 2022-03-18 NOTE — ED NOTES
Past Medical History:   Diagnosis Date    CAD (coronary artery disease)     Hernia, inguinal      Past Surgical History:   Procedure Laterality Date    CARDIAC SURG PROCEDURE UNLIST  2015 &2016    stents     52yo male pt with significant PMH of cardiac stents presents to ED for abdominal pain and constipation x 6hrs, straining for bowel movement, diarrhea with bright red blood, PMH stents x 2, HTN, hernia, GIB ulcer, oriented to room and call bell, cardiac and continuous spO2 monitoring initiated, IV started, blood samples collected and sent to lab for analysis, EKG and CT scan completed, plan of care reviewed, call bell in reach, side rails up x2, will continue to monitor.